# Patient Record
Sex: MALE | Race: WHITE | Employment: FULL TIME | URBAN - METROPOLITAN AREA
[De-identification: names, ages, dates, MRNs, and addresses within clinical notes are randomized per-mention and may not be internally consistent; named-entity substitution may affect disease eponyms.]

---

## 2017-01-09 ENCOUNTER — LAB CONVERSION - ENCOUNTER (OUTPATIENT)
Dept: OTHER | Facility: OTHER | Age: 37
End: 2017-01-09

## 2017-01-09 LAB
CHOLEST SERPL-MCNC: 161 MG/DL (ref 125–200)
CHOLEST/HDLC SERPL: 3.4 (CALC)
CREATININE, RANDOM URINE (HISTORICAL): 173 MG/DL (ref 20–370)
HBA1C MFR BLD HPLC: 6.9 % OF TOTAL HGB
HDLC SERPL-MCNC: 47 MG/DL
LDL CHOLESTEROL (HISTORICAL): 95 MG/DL (CALC)
MAGNESIUM, UR (HISTORICAL): 0.4 MG/DL
MICROALBUMIN/CREATININE RATIO (HISTORICAL): 2 MCG/MG CREAT
NON-HDL-CHOL (CHOL-HDL) (HISTORICAL): 114 MG/DL (CALC)
TRIGL SERPL-MCNC: 97 MG/DL

## 2017-01-10 ENCOUNTER — ALLSCRIPTS OFFICE VISIT (OUTPATIENT)
Dept: OTHER | Facility: OTHER | Age: 37
End: 2017-01-10

## 2017-07-10 DIAGNOSIS — E66.9 OBESITY: ICD-10-CM

## 2017-07-10 DIAGNOSIS — E78.5 HYPERLIPIDEMIA: ICD-10-CM

## 2017-07-10 DIAGNOSIS — E11.9 TYPE 2 DIABETES MELLITUS WITHOUT COMPLICATIONS (HCC): ICD-10-CM

## 2017-07-10 DIAGNOSIS — J45.909 UNCOMPLICATED ASTHMA: ICD-10-CM

## 2017-11-21 ENCOUNTER — LAB CONVERSION - ENCOUNTER (OUTPATIENT)
Dept: OTHER | Facility: OTHER | Age: 37
End: 2017-11-21

## 2017-11-21 LAB
A/G RATIO (HISTORICAL): 1.6 (CALC) (ref 1–2.5)
ALBUMIN SERPL BCP-MCNC: 4.1 G/DL (ref 3.6–5.1)
ALP SERPL-CCNC: 40 U/L (ref 40–115)
ALT SERPL W P-5'-P-CCNC: 37 U/L (ref 9–46)
AST SERPL W P-5'-P-CCNC: 24 U/L (ref 10–40)
BASOPHILS # BLD AUTO: 0.8 %
BASOPHILS # BLD AUTO: 59 CELLS/UL (ref 0–200)
BILIRUB SERPL-MCNC: 0.6 MG/DL (ref 0.2–1.2)
BUN SERPL-MCNC: 12 MG/DL (ref 7–25)
BUN/CREA RATIO (HISTORICAL): ABNORMAL (CALC) (ref 6–22)
CALCIUM SERPL-MCNC: 9.1 MG/DL (ref 8.6–10.3)
CHLORIDE SERPL-SCNC: 106 MMOL/L (ref 98–110)
CHOLEST SERPL-MCNC: 155 MG/DL
CHOLEST/HDLC SERPL: 3.5 (CALC)
CO2 SERPL-SCNC: 26 MMOL/L (ref 20–31)
CREAT SERPL-MCNC: 0.8 MG/DL (ref 0.6–1.35)
CREATININE, RANDOM URINE (HISTORICAL): 120 MG/DL (ref 20–370)
DEPRECATED RDW RBC AUTO: 13 % (ref 11–15)
EGFR AFRICAN AMERICAN (HISTORICAL): 134 ML/MIN/1.73M2
EGFR-AMERICAN CALC (HISTORICAL): 116 ML/MIN/1.73M2
EOSINOPHIL # BLD AUTO: 0.8 %
EOSINOPHIL # BLD AUTO: 59 CELLS/UL (ref 15–500)
GAMMA GLOBULIN (HISTORICAL): 2.5 G/DL (CALC) (ref 1.9–3.7)
GLUCOSE (HISTORICAL): 147 MG/DL (ref 65–99)
HBA1C MFR BLD HPLC: 7.2 % OF TOTAL HGB
HCT VFR BLD AUTO: 43.2 % (ref 38.5–50)
HDLC SERPL-MCNC: 44 MG/DL
HGB BLD-MCNC: 14.5 G/DL (ref 13.2–17.1)
LDL CHOLESTEROL (HISTORICAL): 91 MG/DL (CALC)
LYMPHOCYTES # BLD AUTO: 2568 CELLS/UL (ref 850–3900)
LYMPHOCYTES # BLD AUTO: 34.7 %
MAGNESIUM, UR (HISTORICAL): 0.3 MG/DL
MCH RBC QN AUTO: 28.5 PG (ref 27–33)
MCHC RBC AUTO-ENTMCNC: 33.6 G/DL (ref 32–36)
MCV RBC AUTO: 85 FL (ref 80–100)
MICROALBUMIN/CREATININE RATIO (HISTORICAL): 3 MCG/MG CREAT
MONOCYTES # BLD AUTO: 407 CELLS/UL (ref 200–950)
MONOCYTES (HISTORICAL): 5.5 %
NEUTROPHILS # BLD AUTO: 4307 CELLS/UL (ref 1500–7800)
NEUTROPHILS # BLD AUTO: 58.2 %
NON-HDL-CHOL (CHOL-HDL) (HISTORICAL): 111 MG/DL (CALC)
PLATELET # BLD AUTO: 288 THOUSAND/UL (ref 140–400)
PMV BLD AUTO: 10 FL (ref 7.5–12.5)
POTASSIUM SERPL-SCNC: 4.1 MMOL/L (ref 3.5–5.3)
RBC # BLD AUTO: 5.08 MILLION/UL (ref 4.2–5.8)
SODIUM SERPL-SCNC: 141 MMOL/L (ref 135–146)
TOTAL PROTEIN (HISTORICAL): 6.6 G/DL (ref 6.1–8.1)
TRIGL SERPL-MCNC: 105 MG/DL
TSH SERPL DL<=0.05 MIU/L-ACNC: 1.28 MIU/L (ref 0.4–4.5)
WBC # BLD AUTO: 7.4 THOUSAND/UL (ref 3.8–10.8)

## 2017-11-22 ENCOUNTER — ALLSCRIPTS OFFICE VISIT (OUTPATIENT)
Dept: OTHER | Facility: OTHER | Age: 37
End: 2017-11-22

## 2017-11-23 NOTE — PROGRESS NOTES
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 6) (G57 60)    Plan  Asthma    · Ventolin  (90 Base) MCG/ACT Inhalation Aerosol Solution; INHALE 1 TO2 PUFFS EVERY 4 TO 6 HOURS AS NEEDED  Controlled type 2 diabetes mellitus without complication    · Janumet -1000 MG Oral Tablet Extended Release 24 Hour; take 1 tabletby mouth daily   · *VB - Foot Exam; Status:Resulted - Requires Verification,Retrospective By ProtocolAuthorization;   Done: [id number] 02:29PM   · Lisbet Fay MD, Ally Horn  (Ophthalmology) Co-Management  *  Status: Hold For -Scheduling,Retrospective By Protocol Authorization  Requested for: [id number]  Care Summary provided  : Yes  Controlled type 2 diabetes mellitus without complication, Hyperlipidemia, Obesity    · (1) CBC/PLT/DIFF; Status:Active; Requested for:77Lku2647;    · (1) COMPREHENSIVE METABOLIC PANEL; Status:Active; Requested for:73Sui7872;    · (1) HEMOGLOBIN A1C; Status:Active; Requested for:62Drz2132;    · (1) LIPID PANEL FASTING W DIRECT LDL REFLEX; Status:Active; Requestedfor:04Bcg9665;    · (1) MICROALBUMIN CREATININE RATIO, RANDOM URINE; Status:Active; Requestedfor:46Zpb5265;    · (1) TSH WITH FT4 REFLEX; Status:Active; Requested for:59Ciw9722; Tinea cruris    · Clotrimazole-Betamethasone 1-0 05 % External Cream; APPLY  AND RUB  IN ATHIN FILM TO AFFECTED AREAS TWICE DAILY  (AM AND PM)    Discussion/Summary    1  Type 2 diabetes-uncontrolled-without complication-hemoglobin Z3F has gone up from 6 9 to 7 2  He will continue the same therapy but changed his diet over the next 3-4 months and reassess  Foot exam was within normal limits in the office  Hyperlipidemia-stable  Cholesterol numbers were reviewed with the patient  Continue dietary changes  Obesity-continue weight loss efforts  Tinea carlos-recommend trial Lotrisone cream applied twice daily to the affected area  Ponce's neuroma-symptoms are rather mild and intermittent  If they worsen or persist he will be referred to Podiatry for further treatment  in 3-4 months with labs recommended  maintenance-flu vaccine was declined by patient  Chief Complaint  Follow up to chronic conditions and review bw  History of Present Illness  This is a 26-year-old gentleman that presents to the office for follow-up of chronic health conditions including type 2 diabetes, hyperlipidemia, and obesity  He does mention that recently he has been eating some junk food with the holidays  He expects that this may continue through December but then he is usually back on his diet and watching his consumption  He has had recent blood test which she is here to discuss  He does continue to take Janumet tablet daily  He has not had any side effects or problems with the medication  He also mentions a thickening on his scrotum which has been itchy and irritated at times  He thinks that it may be sweater moisture related as he sits most of the day  He has tried gold Bond powder and changing underwear frequently with little benefit  It does seem to come and go  He does also mention some pain on his foot that seems to come and go  This is worse when he is on his feet for a period of time  It is located between the ball of the feet at the 2nd and 3rd interspace  It is often tender to touch  Review of Systems   Constitutional: no fever,-- not feeling poorly,-- no chills-- and-- not feeling tired  Eyes: no eyesight problems-- and-- no purulent discharge from the eyes  ENT: no nosebleeds-- and-- no nasal discharge  Cardiovascular: no chest pain-- and-- no palpitations  Respiratory: no shortness of breath,-- no cough,-- no wheezing-- and-- no shortness of breath during exertion  Gastrointestinal: no abdominal pain,-- no nausea-- and-- no diarrhea    Musculoskeletal: no arthralgias-- and-- no myalgias  Neurological: no headache,-- no numbness-- and-- no dizziness  Hematologic/Lymphatic: no swollen glands-- and-- no swollen glands in the neck  Active Problems  1  Asthma (493 90) (J45 909)   2  Controlled type 2 diabetes mellitus without complication (968 32) (D67 5)   3  Elevated alkaline phosphatase level (790 5) (R74 8)   4  Denied: History of mental disorder   5  Hyperlipidemia (272 4) (E78 5)   6  Obesity (278 00) (E66 9)   7  Sinusitis, acute (461 9) (J01 90)    Past Medical History  1  Denied: History of mental disorder    The active problems and past medical history were reviewed and updated today  Family History  Family History    1  No family history of mental disorder    Social History     · Does not use illicit drugs (F91 40) (Z78 9)   · Employed   ·    · Never a smoker   · No alcohol use   · No secondhand smoke exposure (V49 89) (Z78 9)    Current Meds   1  Janumet -1000 MG Oral Tablet Extended Release 24 Hour; take 1 tablet by mouth daily; Therapy: 34BHF0197 to (Evaluate:01Jan2018)  Requested for: 61CUQ3719; Last Rx:70Ryr6664 Ordered   2  OneTouch Verio In [employer]; TEST 3 TIMES DAILY; Therapy: 29Apr2015 to (Evaluate:02Jun2016)  Requested for: 22VXG4255; Last Rx:08Jun2015 Ordered   3  Ventolin  (90 Base) MCG/ACT Inhalation Aerosol Solution; INHALE 1 TO 2 PUFFS EVERY 4 TO 6 HOURS AS NEEDED; Therapy: 67JBL2490 to (Last Rx:25Jan2016)  Requested for: 25Jan2016 Ordered    The medication list was reviewed and updated today  Allergies  1  No Known Drug Allergies    Vitals  Vital Signs    Recorded: 22Nov2017 02:26PM   Heart Rate 88   Systolic 475   Diastolic 78   Height 6 ft 5 in   Weight 376 lb 6 oz   BMI Calculated 44 63   BSA Calculated 2 93       Physical Exam   Constitutional  General appearance: No acute distress, well appearing and well nourished  Eyes  Conjunctiva and lids: No swelling, erythema, or discharge     Pupils and irises: Equal, round and reactive to light  Ears, Nose, Mouth, and Throat  External inspection of ears and nose: Normal    Otoscopic examination: Tympanic membrance translucent with normal light reflex  Canals patent without erythema  Nasal mucosa, septum, and turbinates: Normal without edema or erythema  Oropharynx: Normal with no erythema, edema, exudate or lesions  Pulmonary  Respiratory effort: No increased work of breathing or signs of respiratory distress  Auscultation of lungs: Clear to auscultation, equal breath sounds bilaterally, no wheezes, no rales, no rhonci  Cardiovascular  Palpation of heart: Normal PMI, no thrills  Auscultation of heart: Normal rate and rhythm, normal S1 and S2, without murmurs  Examination of extremities for edema and/or varicosities: Normal    Abdomen  Abdomen: Non-tender, no masses  Liver and spleen: No hepatomegaly or splenomegaly  Lymphatic  Palpation of lymph nodes in neck: No lymphadenopathy  Musculoskeletal  Gait and station: Normal    Digits and nails: Normal without clubbing or cyanosis  Skin  Skin and subcutaneous tissue: Normal without rashes or lesions  Neurologic  Cranial nerves: Cranial nerves 2-12 intact  Reflexes: 2+ and symmetric  Psychiatric  Orientation to person, place and time: Normal    Mood and affect: Normal    Additional Exam:  There is some scrotal thickening and erythema at the anterior left side  Socks and shoes removed, Right Foot Findings: normal foot, no swelling, no erythema  The right toes were normal   The sensory exam showed normal vibratory sensation at the level of the toes on the right  Normal tactile sensation with monofilament testing throughout the right foot  Socks and shoes removed, Left Foot Findings: normal foot, no swelling, no erythema  The left toes were normal   The sensory exam showed normal vibratory sensation at the level of the toes on the left   Normal tactile sensation with monofilament testing throughout the left foot  Capillary refills findings on the right were normal in the toes  Pulses:  2+ in the posterior tibialis on the right  2+ in the dorsalis pedis on the right  Capillary refills findings on the left were normal in the toes  Pulses:  2+ in the posterior tibialis on the left  2+ in the dorsalis pedis on the left  Assign Risk Category: 0: No loss of protective sensation, no deformity  No present risk  Results/Data  PHQ-2 Adult Depression Screening 22Nov2017 02:35PM User, [facility]     Test Name Result Flag Reference   PHQ-2 Adult Depression Score 0       Over the last two weeks, how often have you been bothered by any of the following problems?  Little interest or pleasure in doing things: Not at all - 0 Feeling down, depressed, or hopeless: Not at all - 0   PHQ-2 Adult Depression Screening Negative       *VB - Foot Exam 66VKQ8880 02:29PM Ovidio Brown     Test Name Result Flag Reference   FOOT EXAM [id number]                       Signatures   Electronically signed by : Christopher Bhatti [facility]; Nov 22 2017  2:59PM EST                       (Author)    Electronically signed by : NBA Pelaez ; Nov 22 2017  6:27PM EST

## 2018-01-12 VITALS
HEART RATE: 80 BPM | HEIGHT: 77 IN | WEIGHT: 315 LBS | DIASTOLIC BLOOD PRESSURE: 78 MMHG | SYSTOLIC BLOOD PRESSURE: 118 MMHG | BODY MASS INDEX: 37.19 KG/M2

## 2018-01-13 VITALS
WEIGHT: 315 LBS | BODY MASS INDEX: 37.19 KG/M2 | DIASTOLIC BLOOD PRESSURE: 78 MMHG | HEIGHT: 77 IN | SYSTOLIC BLOOD PRESSURE: 114 MMHG | HEART RATE: 88 BPM

## 2018-02-22 DIAGNOSIS — E11.9 TYPE 2 DIABETES MELLITUS WITHOUT COMPLICATIONS (HCC): ICD-10-CM

## 2018-02-22 DIAGNOSIS — E66.9 OBESITY: ICD-10-CM

## 2018-02-22 DIAGNOSIS — E78.5 HYPERLIPIDEMIA: ICD-10-CM

## 2018-03-05 ENCOUNTER — OFFICE VISIT (OUTPATIENT)
Dept: FAMILY MEDICINE CLINIC | Facility: CLINIC | Age: 38
End: 2018-03-05
Payer: COMMERCIAL

## 2018-03-05 VITALS
WEIGHT: 315 LBS | SYSTOLIC BLOOD PRESSURE: 130 MMHG | DIASTOLIC BLOOD PRESSURE: 80 MMHG | BODY MASS INDEX: 37.19 KG/M2 | HEART RATE: 72 BPM | HEIGHT: 77 IN

## 2018-03-05 DIAGNOSIS — E11.9 CONTROLLED TYPE 2 DIABETES MELLITUS WITHOUT COMPLICATION, WITHOUT LONG-TERM CURRENT USE OF INSULIN (HCC): Primary | ICD-10-CM

## 2018-03-05 DIAGNOSIS — B35.6 TINEA CRURIS: ICD-10-CM

## 2018-03-05 DIAGNOSIS — IMO0001 CLASS 3 OBESITY WITHOUT SERIOUS COMORBIDITY IN ADULT, UNSPECIFIED BMI, UNSPECIFIED OBESITY TYPE: ICD-10-CM

## 2018-03-05 DIAGNOSIS — E78.5 HYPERLIPIDEMIA, UNSPECIFIED HYPERLIPIDEMIA TYPE: ICD-10-CM

## 2018-03-05 PROBLEM — G57.60 MORTON'S NEUROMA: Status: ACTIVE | Noted: 2017-11-22

## 2018-03-05 LAB
ALBUMIN SERPL-MCNC: 4.1 G/DL (ref 3.6–5.1)
ALBUMIN/CREAT UR: 3 MCG/MG CREAT
ALBUMIN/GLOB SERPL: 1.5 (CALC) (ref 1–2.5)
ALP SERPL-CCNC: 46 U/L (ref 40–115)
ALT SERPL-CCNC: 32 U/L (ref 9–46)
AST SERPL-CCNC: 23 U/L (ref 10–40)
BASOPHILS # BLD AUTO: 58 CELLS/UL (ref 0–200)
BASOPHILS NFR BLD AUTO: 0.7 %
BILIRUB SERPL-MCNC: 0.6 MG/DL (ref 0.2–1.2)
BUN SERPL-MCNC: 13 MG/DL (ref 7–25)
BUN/CREAT SERPL: ABNORMAL (CALC) (ref 6–22)
CALCIUM SERPL-MCNC: 9.4 MG/DL (ref 8.6–10.3)
CHLORIDE SERPL-SCNC: 106 MMOL/L (ref 98–110)
CHOLEST SERPL-MCNC: 154 MG/DL
CHOLEST/HDLC SERPL: 3.6 (CALC)
CO2 SERPL-SCNC: 24 MMOL/L (ref 20–31)
CREAT SERPL-MCNC: 0.76 MG/DL (ref 0.6–1.35)
CREAT UR-MCNC: 243 MG/DL (ref 20–370)
EOSINOPHIL # BLD AUTO: 50 CELLS/UL (ref 15–500)
EOSINOPHIL NFR BLD AUTO: 0.6 %
ERYTHROCYTE [DISTWIDTH] IN BLOOD BY AUTOMATED COUNT: 12.7 % (ref 11–15)
GLOBULIN SER CALC-MCNC: 2.7 G/DL (CALC) (ref 1.9–3.7)
GLUCOSE SERPL-MCNC: 160 MG/DL (ref 65–99)
HBA1C MFR BLD: 7.8 % OF TOTAL HGB
HCT VFR BLD AUTO: 44.4 % (ref 38.5–50)
HDLC SERPL-MCNC: 43 MG/DL
HGB BLD-MCNC: 15 G/DL (ref 13.2–17.1)
LDLC SERPL CALC-MCNC: 93 MG/DL (CALC)
LYMPHOCYTES # BLD AUTO: 2847 CELLS/UL (ref 850–3900)
LYMPHOCYTES NFR BLD AUTO: 34.3 %
MCH RBC QN AUTO: 29 PG (ref 27–33)
MCHC RBC AUTO-ENTMCNC: 33.8 G/DL (ref 32–36)
MCV RBC AUTO: 85.9 FL (ref 80–100)
MICROALBUMIN UR-MCNC: 0.8 MG/DL
MONOCYTES # BLD AUTO: 473 CELLS/UL (ref 200–950)
MONOCYTES NFR BLD AUTO: 5.7 %
NEUTROPHILS # BLD AUTO: 4872 CELLS/UL (ref 1500–7800)
NEUTROPHILS NFR BLD AUTO: 58.7 %
NONHDLC SERPL-MCNC: 111 MG/DL (CALC)
PLATELET # BLD AUTO: 271 THOUSAND/UL (ref 140–400)
PMV BLD REES-ECKER: 10.1 FL (ref 7.5–12.5)
POTASSIUM SERPL-SCNC: 4 MMOL/L (ref 3.5–5.3)
PROT SERPL-MCNC: 6.8 G/DL (ref 6.1–8.1)
RBC # BLD AUTO: 5.17 MILLION/UL (ref 4.2–5.8)
SL AMB EGFR AFRICAN AMERICAN: 136 ML/MIN/1.73M2
SL AMB EGFR NON AFRICAN AMERICAN: 117 ML/MIN/1.73M2
SODIUM SERPL-SCNC: 141 MMOL/L (ref 135–146)
TRIGL SERPL-MCNC: 90 MG/DL
TSH SERPL-ACNC: 1.51 MIU/L (ref 0.4–4.5)
WBC # BLD AUTO: 8.3 THOUSAND/UL (ref 3.8–10.8)

## 2018-03-05 PROCEDURE — 99214 OFFICE O/P EST MOD 30 MIN: CPT | Performed by: PHYSICIAN ASSISTANT

## 2018-03-05 RX ORDER — ALBUTEROL SULFATE 90 UG/1
1-2 AEROSOL, METERED RESPIRATORY (INHALATION)
COMMUNITY
Start: 2016-01-25

## 2018-03-05 RX ORDER — GLIMEPIRIDE 2 MG/1
2 TABLET ORAL
Qty: 30 TABLET | Refills: 5 | Status: SHIPPED | OUTPATIENT
Start: 2018-03-05 | End: 2018-07-05 | Stop reason: SDUPTHER

## 2018-03-05 RX ORDER — NYSTATIN 100000 [USP'U]/G
POWDER TOPICAL 3 TIMES DAILY
Qty: 45 G | Refills: 0 | Status: SHIPPED | OUTPATIENT
Start: 2018-03-05

## 2018-03-05 NOTE — PROGRESS NOTES
Assessment/Plan:  Patient Instructions   1  Type 2 diabetes/uncontrolled with hemoglobin A1c going up from 7 2 to 7 8  We discussed treatment options and at this point he was agreeable to adding glimepiride 2 mg daily  He will continue on Janumet /1000 mg daily  We will reassess his hemoglobin A1c in 3 months time  He is to continue regular assessment with Ophthalmology yearly  2   Hyperlipidemia-cholesterol numbers were reviewed with patient and currently stable with diet control  3   Tinea cruris-patient failed Lotrisone cream and thus we will try nice stop powder  If symptoms are still not better in the next 2 weeks he will be referred to Dermatology  Patient verbalized understanding and agreement with plan  No problem-specific Assessment & Plan notes found for this encounter  Diagnoses and all orders for this visit:    Controlled type 2 diabetes mellitus without complication, without long-term current use of insulin (HCC)  -     HEMOGLOBIN A1C W/ EAG ESTIMATION; Future  -     Comprehensive metabolic panel; Future  -     glimepiride (AMARYL) 2 mg tablet; Take 1 tablet (2 mg total) by mouth daily with breakfast    Hyperlipidemia, unspecified hyperlipidemia type  -     Comprehensive metabolic panel; Future  -     Lipid Panel with Direct LDL reflex; Future    Class 3 obesity without serious comorbidity in adult, unspecified BMI, unspecified obesity type  -     Comprehensive metabolic panel; Future    Tinea cruris  -     nystatin (MYCOSTATIN) powder; Apply topically 3 (three) times a day          Subjective:      Patient ID: Alie Cancer is a 41 y o  male  Chief complaint:  Pt is here for follow up of diabetes and to review blood work   ~cd    HPI:  This is a 16-year-old gentleman that presents to the office for follow-up of diabetes and recent blood test which she had completed  He is currently taking Janumet XR daily for his diabetes management    Despite this he notices that his sugars have been elevated recently with many readings between 160 and 200 in the morning fasting  He states that he has been feeling worse than usual as well  He has not been as physically active  The following portions of the patient's history were reviewed and updated as appropriate: allergies, current medications, past family history, past medical history, past social history, past surgical history and problem list     Review of Systems   Constitutional: Negative for chills, fatigue and fever  HENT: Negative for congestion, ear pain and sinus pressure  Eyes: Negative for visual disturbance  Respiratory: Negative for cough, chest tightness and shortness of breath  Cardiovascular: Negative for chest pain and palpitations  Gastrointestinal: Negative for diarrhea, nausea and vomiting  Endocrine: Negative for polyuria  Genitourinary: Negative for dysuria and frequency  Musculoskeletal: Negative for arthralgias and myalgias  Skin: Negative for pallor and rash  Neurological: Negative for dizziness, weakness, light-headedness, numbness and headaches  Psychiatric/Behavioral: Negative for agitation, behavioral problems and sleep disturbance  All other systems reviewed and are negative  Objective  Vitals:    03/05/18 1806   BP: 130/80   BP Location: Left arm   Patient Position: Sitting   Cuff Size: Large   Pulse: 72   Weight: (!) 171 kg (377 lb 12 8 oz)   Height: 6' 5" (1 956 m)              Physical Exam   Constitutional: He is oriented to person, place, and time  He appears well-developed and well-nourished  No distress  HENT:   Head: Normocephalic and atraumatic  Right Ear: External ear normal    Left Ear: External ear normal    Nose: Nose normal    Mouth/Throat: Oropharynx is clear and moist  No oropharyngeal exudate  Eyes: Conjunctivae and EOM are normal  Pupils are equal, round, and reactive to light  Neck: Normal range of motion  Neck supple   No tracheal deviation present  No thyromegaly present  Cardiovascular: Normal rate, regular rhythm and normal heart sounds  Exam reveals no friction rub  No murmur heard  Pulmonary/Chest: Effort normal and breath sounds normal  No respiratory distress  He has no wheezes  He has no rales  Abdominal: Soft  Bowel sounds are normal  He exhibits no distension  There is no tenderness  There is no rebound and no guarding  Musculoskeletal: Normal range of motion  He exhibits no edema or tenderness  Lymphadenopathy:     He has no cervical adenopathy  Neurological: He is alert and oriented to person, place, and time  No cranial nerve deficit  Coordination normal    Skin: Skin is warm and dry  Rash noted  No erythema  There is some erythema and generalized thickening of the scrotal tissue  Psychiatric: He has a normal mood and affect  His behavior is normal  Thought content normal    Nursing note and vitals reviewed

## 2018-05-07 ENCOUNTER — OFFICE VISIT (OUTPATIENT)
Dept: FAMILY MEDICINE CLINIC | Facility: CLINIC | Age: 38
End: 2018-05-07
Payer: COMMERCIAL

## 2018-05-07 VITALS
SYSTOLIC BLOOD PRESSURE: 120 MMHG | BODY MASS INDEX: 37.19 KG/M2 | DIASTOLIC BLOOD PRESSURE: 60 MMHG | WEIGHT: 315 LBS | HEIGHT: 77 IN | HEART RATE: 76 BPM

## 2018-05-07 DIAGNOSIS — F41.9 ANXIETY: ICD-10-CM

## 2018-05-07 DIAGNOSIS — N50.89 MASS OF LEFT TESTICLE: Primary | ICD-10-CM

## 2018-05-07 PROCEDURE — 99214 OFFICE O/P EST MOD 30 MIN: CPT | Performed by: PHYSICIAN ASSISTANT

## 2018-05-07 RX ORDER — ESCITALOPRAM OXALATE 10 MG/1
10 TABLET ORAL DAILY
Qty: 30 TABLET | Refills: 3 | Status: SHIPPED | OUTPATIENT
Start: 2018-05-07 | End: 2018-06-11 | Stop reason: SDUPTHER

## 2018-05-07 NOTE — PROGRESS NOTES
Assessment/Plan:  Patient Instructions   1  Left testicular mass-patient has a small ridge like area that he can feel on the left lateral testicle  Ultrasound is recommended for further characterization  2   Anxiety-patient is having stress over his wife relieving him recently  He is having a lot of emotional lability  He is worried about his job in looking at other jobs  He is considering moving back to the [place] area where he has more friends and support network for himself  We discussed treatment options at this point and I feel Lexapro 10 mg daily would be a good option  We discussed possible side effects of the medication and appropriate treatment  Patient verbalized understanding and agreement  We do have follow-up in the next month to reassess therapy  3   Type 2 diabetes-last hemoglobin A1c was 7 8  Patient has been exercising and watching his diet closely and has lost 20 lb in the past 2 months however  He has also had glimepiride at our last visit and we will reassess therapy in June as scheduled  No problem-specific Assessment & Plan notes found for this encounter  Diagnoses and all orders for this visit:    Mass of left testicle  -     US scrotum and testicles; Future    Anxiety  -     escitalopram (LEXAPRO) 10 mg tablet; Take 1 tablet (10 mg total) by mouth daily          Subjective: pt is here for a dry skin patch on his testicle~cd     Patient ID: Jeniffer Randall is a 41 y o  male  HPI:  This is a 45-year-old gentleman that presents to the office with concerns over left testicular abnormality  He feels that there is a lump for [facility] within the testicle  He has also had some dry skin patch which has been treated with Mycolog powder  This seems to help it get better but never completely resolves  He occasionally has a pain in the testicle but it does not seem to last long  He also states that 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    He is wondering if there may be something going on within the testicle read the tubing that is affecting him  Furthermore he is having a lot of anxiety as his wife is leaving him and has moved out already  He is having a lot of emotional ups and Downs  He does not have a lot of family or support in the area and is looking to possibly move to [place] or [place] from where he is originally from  The following portions of the patient's history were reviewed and updated as appropriate: allergies, current medications, past family history, past medical history, past social history, past surgical history and problem list     Review of Systems   Constitutional: Negative for chills, fatigue and fever  HENT: Negative for congestion, ear pain and sinus pressure  Eyes: Negative for visual disturbance  Respiratory: Negative for cough, chest tightness and shortness of breath  Cardiovascular: Negative for chest pain and palpitations  Gastrointestinal: Negative for diarrhea, nausea and vomiting  Endocrine: Negative for polyuria  Genitourinary: Negative for dysuria and frequency  Musculoskeletal: Negative for arthralgias and myalgias  Skin: Negative for pallor and rash  Neurological: Negative for dizziness, weakness, light-headedness, numbness and headaches  Psychiatric/Behavioral: Negative for agitation, behavioral problems and sleep disturbance  All other systems reviewed and are negative  Objective:  Vitals:    05/07/18 1913   BP: 120/60   BP Location: Left arm   Patient Position: Sitting   Cuff Size: Large   Pulse: 76   Weight: (!) 162 kg (357 lb)   Height: 6' 5" (1 956 m)                Physical Exam   Constitutional: He is oriented to person, place, and time  He appears well-developed and well-nourished  No distress  HENT:   Head: Normocephalic and atraumatic     Right Ear: External ear normal    Left Ear: External ear normal    Nose: Nose normal    Mouth/Throat: Oropharynx is clear and moist  No oropharyngeal exudate  Eyes: Conjunctivae and EOM are normal  Pupils are equal, round, and reactive to light  Neck: Normal range of motion  Neck supple  No tracheal deviation present  No thyromegaly present  Cardiovascular: Normal rate, regular rhythm and normal heart sounds  Exam reveals no friction rub  No murmur heard  Pulmonary/Chest: Effort normal and breath sounds normal  No respiratory distress  He has no wheezes  He has no rales  Abdominal: Soft  Bowel sounds are normal  He exhibits no distension  There is no tenderness  There is no rebound and no guarding  Genitourinary:   Genitourinary Comments: Left testicle with a small ridge like area which is palpable on the lateral testicle  Musculoskeletal: Normal range of motion  He exhibits no edema or tenderness  Lymphadenopathy:     He has no cervical adenopathy  Neurological: He is alert and oriented to person, place, and time  No cranial nerve deficit  Coordination normal    Skin: Skin is warm and dry  No rash noted  No erythema  Psychiatric: His behavior is normal  Thought content normal    Depressed  Nursing note and vitals reviewed

## 2018-05-08 NOTE — PATIENT INSTRUCTIONS
1   Left testicular mass-patient has a small ridge like area that he can feel on the left lateral testicle  Ultrasound is recommended for further characterization  2   Anxiety-patient is having stress over his wife relieving him recently  He is having a lot of emotional lability  He is worried about his job in looking at other jobs  He is considering moving back to the [place] area where he has more friends and support network for himself  We discussed treatment options at this point and I feel Lexapro 10 mg daily would be a good option  We discussed possible side effects of the medication and appropriate treatment  Patient verbalized understanding and agreement  We do have follow-up in the next month to reassess therapy  3   Type 2 diabetes-last hemoglobin A1c was 7 8  Patient has been exercising and watching his diet closely and has lost 20 lb in the past 2 months however  He has also had glimepiride at our last visit and we will reassess therapy in June as scheduled

## 2018-06-10 LAB
ALBUMIN SERPL-MCNC: 4.1 G/DL (ref 3.6–5.1)
ALBUMIN/GLOB SERPL: 1.6 (CALC) (ref 1–2.5)
ALP SERPL-CCNC: 42 U/L (ref 40–115)
ALT SERPL-CCNC: 22 U/L (ref 9–46)
AST SERPL-CCNC: 17 U/L (ref 10–40)
BILIRUB SERPL-MCNC: 0.6 MG/DL (ref 0.2–1.2)
BUN SERPL-MCNC: 13 MG/DL (ref 7–25)
BUN/CREAT SERPL: ABNORMAL (CALC) (ref 6–22)
CALCIUM SERPL-MCNC: 9.6 MG/DL (ref 8.6–10.3)
CHLORIDE SERPL-SCNC: 108 MMOL/L (ref 98–110)
CHOLEST SERPL-MCNC: 157 MG/DL
CHOLEST/HDLC SERPL: 3.4 (CALC)
CO2 SERPL-SCNC: 25 MMOL/L (ref 20–31)
CREAT SERPL-MCNC: 0.78 MG/DL (ref 0.6–1.35)
EST. AVERAGE GLUCOSE BLD GHB EST-MCNC: 117 (CALC)
EST. AVERAGE GLUCOSE BLD GHB EST-SCNC: 6.5 (CALC)
GLOBULIN SER CALC-MCNC: 2.5 G/DL (CALC) (ref 1.9–3.7)
GLUCOSE SERPL-MCNC: 104 MG/DL (ref 65–99)
HBA1C MFR BLD: 5.7 % OF TOTAL HGB
HDLC SERPL-MCNC: 46 MG/DL
LDLC SERPL CALC-MCNC: 95 MG/DL (CALC)
NONHDLC SERPL-MCNC: 111 MG/DL (CALC)
POTASSIUM SERPL-SCNC: 4.3 MMOL/L (ref 3.5–5.3)
PROT SERPL-MCNC: 6.6 G/DL (ref 6.1–8.1)
SL AMB EGFR AFRICAN AMERICAN: 135 ML/MIN/1.73M2
SL AMB EGFR NON AFRICAN AMERICAN: 116 ML/MIN/1.73M2
SODIUM SERPL-SCNC: 142 MMOL/L (ref 135–146)
TRIGL SERPL-MCNC: 73 MG/DL

## 2018-06-11 ENCOUNTER — OFFICE VISIT (OUTPATIENT)
Dept: FAMILY MEDICINE CLINIC | Facility: CLINIC | Age: 38
End: 2018-06-11
Payer: COMMERCIAL

## 2018-06-11 VITALS
DIASTOLIC BLOOD PRESSURE: 60 MMHG | WEIGHT: 315 LBS | BODY MASS INDEX: 37.19 KG/M2 | HEIGHT: 77 IN | SYSTOLIC BLOOD PRESSURE: 138 MMHG | HEART RATE: 72 BPM

## 2018-06-11 DIAGNOSIS — E78.5 HYPERLIPIDEMIA, UNSPECIFIED HYPERLIPIDEMIA TYPE: ICD-10-CM

## 2018-06-11 DIAGNOSIS — F41.9 ANXIETY: ICD-10-CM

## 2018-06-11 DIAGNOSIS — E11.9 CONTROLLED TYPE 2 DIABETES MELLITUS WITHOUT COMPLICATION, WITHOUT LONG-TERM CURRENT USE OF INSULIN (HCC): Primary | ICD-10-CM

## 2018-06-11 PROCEDURE — 1036F TOBACCO NON-USER: CPT | Performed by: PHYSICIAN ASSISTANT

## 2018-06-11 PROCEDURE — 99214 OFFICE O/P EST MOD 30 MIN: CPT | Performed by: PHYSICIAN ASSISTANT

## 2018-06-11 PROCEDURE — 3008F BODY MASS INDEX DOCD: CPT | Performed by: PHYSICIAN ASSISTANT

## 2018-06-11 RX ORDER — ESCITALOPRAM OXALATE 10 MG/1
10 TABLET ORAL DAILY
Qty: 90 TABLET | Refills: 3 | Status: SHIPPED | OUTPATIENT
Start: 2018-06-11 | End: 2018-07-05 | Stop reason: SDUPTHER

## 2018-06-11 NOTE — PATIENT INSTRUCTIONS
1   Type 2 diabetes-presently stable with hemoglobin A1c coming down from 7 8 to 5 7  Continue healthy diet and exercise as well as glimepiride in the morning and Janumet twice daily  2   Hyperlipidemia-cholesterol was reviewed and presently stable with diet efforts  3   Anxiety and depression-presently stable on Lexapro 10 mg daily  Recommend continue treatment for minimum of 6-12 months  Patient will be moving to [place] and medications will be continued until he can find a family doctor locally

## 2018-06-11 NOTE — PROGRESS NOTES
Assessment/Plan:  Patient Instructions   1  Type 2 diabetes-presently stable with hemoglobin A1c coming down from 7 8 to 5 7  Continue healthy diet and exercise as well as glimepiride in the morning and Janumet twice daily  2   Hyperlipidemia-cholesterol was reviewed and presently stable with diet efforts  3   Anxiety and depression-presently stable on Lexapro 10 mg daily  Recommend continue treatment for minimum of 6-12 months  Patient will be moving to [place] and medications will be continued until he can find a family doctor locally  Diagnoses and all orders for this visit:    Controlled type 2 diabetes mellitus without complication, without long-term current use of insulin (HCC)    Hyperlipidemia, unspecified hyperlipidemia type    Anxiety  -     escitalopram (LEXAPRO) 10 mg tablet; Take 1 tablet (10 mg total) by mouth daily          Subjective: pt is here to review his recent blood work~cd     Patient ID: Dimitri Sanders is a 41 y o  male  HPI:  This is a 29-year-old gentleman that presents to the office for follow-up of diabetes, hyperlipidemia, and recent anxiety and depression over the divorce from his wife  He states that he has been doing much better since he has been on Lexapro 10 mg daily and is happy to continue it  He has been working on changing his situation and will be taking a new job in [place]  He will be leaving this weekend is going under some transition and would like to continue the medication  He denies any side effects of it  He has also been working hard at his diet and exercise as well as weight loss  He has had recent blood tests reach guarding his sugars and cholesterol which she would like to review  He has started taking glimepiride in the morning and continues to use Janumet          The following portions of the patient's history were reviewed and updated as appropriate: allergies, current medications, past family history, past medical history, past social history, past surgical history and problem list     Review of Systems   Constitutional: Negative for chills, fatigue and fever  HENT: Negative for congestion, ear pain and sinus pressure  Eyes: Negative for visual disturbance  Respiratory: Negative for cough, chest tightness and shortness of breath  Cardiovascular: Negative for chest pain and palpitations  Gastrointestinal: Negative for diarrhea, nausea and vomiting  Endocrine: Negative for polyuria  Genitourinary: Negative for dysuria and frequency  Musculoskeletal: Negative for arthralgias and myalgias  Skin: Negative for pallor and rash  Neurological: Negative for dizziness, weakness, light-headedness, numbness and headaches  Psychiatric/Behavioral: Negative for agitation, behavioral problems and sleep disturbance  All other systems reviewed and are negative  Objective:  Vitals:    06/11/18 1759   BP: 138/60   BP Location: Left arm   Patient Position: Sitting   Cuff Size: Large   Pulse: 72   Weight: (!) 161 kg (354 lb 9 6 oz)   Height: 6' 5" (1 956 m)       /60 (BP Location: Left arm, Patient Position: Sitting, Cuff Size: Large)   Pulse 72   Ht 6' 5" (1 956 m)   Wt (!) 161 kg (354 lb 9 6 oz)   BMI 42 05 kg/m²          Physical Exam   Constitutional: He is oriented to person, place, and time  He appears well-developed and well-nourished  No distress  HENT:   Head: Normocephalic and atraumatic  Right Ear: External ear normal    Left Ear: External ear normal    Nose: Nose normal    Mouth/Throat: Oropharynx is clear and moist  No oropharyngeal exudate  Eyes: Conjunctivae and EOM are normal  Pupils are equal, round, and reactive to light  Neck: Normal range of motion  Neck supple  No tracheal deviation present  No thyromegaly present  Cardiovascular: Normal rate, regular rhythm and normal heart sounds  Exam reveals no friction rub  No murmur heard    Pulmonary/Chest: Effort normal and breath sounds normal  No respiratory distress  He has no wheezes  He has no rales  Abdominal: Soft  Bowel sounds are normal  He exhibits no distension  There is no tenderness  There is no rebound and no guarding  Musculoskeletal: Normal range of motion  He exhibits no edema or tenderness  Lymphadenopathy:     He has no cervical adenopathy  Neurological: He is alert and oriented to person, place, and time  No cranial nerve deficit  Coordination normal    Skin: Skin is warm and dry  No rash noted  No erythema  Psychiatric: He has a normal mood and affect  His behavior is normal  Thought content normal    Nursing note and vitals reviewed

## 2018-07-02 DIAGNOSIS — E11.9 TYPE 2 DIABETES MELLITUS WITHOUT COMPLICATION, WITHOUT LONG-TERM CURRENT USE OF INSULIN (HCC): Primary | ICD-10-CM

## 2018-07-02 RX ORDER — SITAGLIPTIN AND METFORMIN HYDROCHLORIDE 100; 1000 MG/1; MG/1
1 TABLET, FILM COATED, EXTENDED RELEASE ORAL DAILY
Qty: 30 TABLET | Refills: 5 | Status: SHIPPED | OUTPATIENT
Start: 2018-07-02 | End: 2018-07-05 | Stop reason: SDUPTHER

## 2018-07-05 ENCOUNTER — TELEPHONE (OUTPATIENT)
Dept: FAMILY MEDICINE CLINIC | Facility: CLINIC | Age: 38
End: 2018-07-05

## 2018-07-05 DIAGNOSIS — F41.9 ANXIETY: ICD-10-CM

## 2018-07-05 DIAGNOSIS — E11.9 TYPE 2 DIABETES MELLITUS WITHOUT COMPLICATION, WITHOUT LONG-TERM CURRENT USE OF INSULIN (HCC): ICD-10-CM

## 2018-07-05 DIAGNOSIS — E11.9 CONTROLLED TYPE 2 DIABETES MELLITUS WITHOUT COMPLICATION, WITHOUT LONG-TERM CURRENT USE OF INSULIN (HCC): ICD-10-CM

## 2018-07-05 RX ORDER — GLIMEPIRIDE 2 MG/1
2 TABLET ORAL
Qty: 90 TABLET | Refills: 0 | Status: SHIPPED | OUTPATIENT
Start: 2018-07-05 | End: 2018-08-30 | Stop reason: SDUPTHER

## 2018-07-05 RX ORDER — ESCITALOPRAM OXALATE 10 MG/1
10 TABLET ORAL DAILY
Qty: 90 TABLET | Refills: 0 | Status: SHIPPED | OUTPATIENT
Start: 2018-07-05 | End: 2018-08-30 | Stop reason: SDUPTHER

## 2018-07-05 NOTE — TELEPHONE ENCOUNTER
Patient is requesting a refill of his Janumet xr , Glimeperide and Lexapro to be sent to [facility] in [facility]  I updated his pharmacy in the snapshot  [place] has [place] out of state  Thanks

## 2018-08-30 DIAGNOSIS — E11.9 CONTROLLED TYPE 2 DIABETES MELLITUS WITHOUT COMPLICATION, WITHOUT LONG-TERM CURRENT USE OF INSULIN (HCC): ICD-10-CM

## 2018-08-30 DIAGNOSIS — F41.9 ANXIETY: ICD-10-CM

## 2018-08-30 RX ORDER — ESCITALOPRAM OXALATE 10 MG/1
TABLET ORAL
Qty: 30 TABLET | Refills: 1 | Status: SHIPPED | OUTPATIENT
Start: 2018-08-30 | End: 2018-10-31 | Stop reason: SDUPTHER

## 2018-08-30 RX ORDER — GLIMEPIRIDE 2 MG/1
2 TABLET ORAL
Qty: 30 TABLET | Refills: 1 | Status: SHIPPED | OUTPATIENT
Start: 2018-08-30 | End: 2018-10-31 | Stop reason: SDUPTHER

## 2018-09-30 DIAGNOSIS — E11.9 TYPE 2 DIABETES MELLITUS WITHOUT COMPLICATION, WITHOUT LONG-TERM CURRENT USE OF INSULIN (HCC): ICD-10-CM

## 2018-10-01 RX ORDER — SITAGLIPTIN AND METFORMIN HYDROCHLORIDE 100; 1000 MG/1; MG/1
TABLET, FILM COATED, EXTENDED RELEASE ORAL
Qty: 90 TABLET | Refills: 0 | Status: SHIPPED | OUTPATIENT
Start: 2018-10-01 | End: 2018-12-29 | Stop reason: SDUPTHER

## 2018-10-31 DIAGNOSIS — E11.9 CONTROLLED TYPE 2 DIABETES MELLITUS WITHOUT COMPLICATION, WITHOUT LONG-TERM CURRENT USE OF INSULIN (HCC): ICD-10-CM

## 2018-10-31 DIAGNOSIS — F41.9 ANXIETY: ICD-10-CM

## 2018-10-31 RX ORDER — GLIMEPIRIDE 2 MG/1
2 TABLET ORAL
Qty: 30 TABLET | Refills: 1 | Status: SHIPPED | OUTPATIENT
Start: 2018-10-31 | End: 2019-01-02 | Stop reason: SDUPTHER

## 2018-10-31 RX ORDER — ESCITALOPRAM OXALATE 10 MG/1
TABLET ORAL
Qty: 30 TABLET | Refills: 1 | Status: SHIPPED | OUTPATIENT
Start: 2018-10-31 | End: 2019-01-02 | Stop reason: SDUPTHER

## 2018-12-27 ENCOUNTER — TELEPHONE (OUTPATIENT)
Dept: FAMILY MEDICINE CLINIC | Facility: CLINIC | Age: 38
End: 2018-12-27

## 2018-12-27 DIAGNOSIS — E11.9 CONTROLLED TYPE 2 DIABETES MELLITUS WITHOUT COMPLICATION, WITHOUT LONG-TERM CURRENT USE OF INSULIN (HCC): ICD-10-CM

## 2018-12-27 DIAGNOSIS — F41.9 ANXIETY: ICD-10-CM

## 2018-12-27 NOTE — TELEPHONE ENCOUNTER
PATIENT HAS MOVED OUT OF STATE TO [place]  HE HAS NOT TECHNICALLY TRANSFERRED OUT OF OUR OFFICE SINCE WE DID NOT RECEIVE ANYTHING FROM ANOTHER PCP OFFICE  HE IS OUT OF HIS MEDS & WAS LOOKING FOR A MONTH SUPPLY OF HIS LEXAPRO, GLIMEPIRIDE, AND JANUMET XR  HE HAS AN APPOINTMENT WITH HIS NEW DOCTOR IN THE MIDDLE OF February AND IT LOOKING FOR A REFILL JUST TO GET HIM BY  I TOLD HIM THAT AN OFFICE VISIT MIGHT BE NECESSARY BUT I WOULD SEND A MESSAGE BACK TO CLARIFY       [facility]   [address] [address] [facility]     PHONE# [phone]    Sravan Cullen!!

## 2018-12-29 DIAGNOSIS — E11.9 TYPE 2 DIABETES MELLITUS WITHOUT COMPLICATION, WITHOUT LONG-TERM CURRENT USE OF INSULIN (HCC): ICD-10-CM

## 2018-12-30 RX ORDER — SITAGLIPTIN AND METFORMIN HYDROCHLORIDE 100; 1000 MG/1; MG/1
TABLET, FILM COATED, EXTENDED RELEASE ORAL
Qty: 90 TABLET | Refills: 0 | Status: SHIPPED | OUTPATIENT
Start: 2018-12-30 | End: 2019-04-04 | Stop reason: SDUPTHER

## 2019-01-02 RX ORDER — GLIMEPIRIDE 2 MG/1
2 TABLET ORAL
Qty: 30 TABLET | Refills: 1 | Status: SHIPPED | OUTPATIENT
Start: 2019-01-02 | End: 2019-08-28 | Stop reason: SDUPTHER

## 2019-01-02 RX ORDER — ESCITALOPRAM OXALATE 10 MG/1
10 TABLET ORAL DAILY
Qty: 30 TABLET | Refills: 1 | Status: SHIPPED | OUTPATIENT
Start: 2019-01-02 | End: 2019-08-28 | Stop reason: SDUPTHER

## 2019-01-02 NOTE — TELEPHONE ENCOUNTER
Orders were placed for 1 month  Please have him schedule with a family doctor locally as we will not be able to continue this long term

## 2019-04-04 DIAGNOSIS — E11.9 TYPE 2 DIABETES MELLITUS WITHOUT COMPLICATION, WITHOUT LONG-TERM CURRENT USE OF INSULIN (HCC): ICD-10-CM

## 2019-04-08 RX ORDER — SITAGLIPTIN AND METFORMIN HYDROCHLORIDE 100; 1000 MG/1; MG/1
TABLET, FILM COATED, EXTENDED RELEASE ORAL
Qty: 90 TABLET | Refills: 0 | Status: SHIPPED | OUTPATIENT
Start: 2019-04-08

## 2019-08-28 DIAGNOSIS — E11.9 CONTROLLED TYPE 2 DIABETES MELLITUS WITHOUT COMPLICATION, WITHOUT LONG-TERM CURRENT USE OF INSULIN (HCC): ICD-10-CM

## 2019-08-28 DIAGNOSIS — F41.9 ANXIETY: ICD-10-CM

## 2019-08-29 RX ORDER — GLIMEPIRIDE 2 MG/1
2 TABLET ORAL
Qty: 30 TABLET | Refills: 1 | Status: SHIPPED | OUTPATIENT
Start: 2019-08-29 | End: 2019-10-28 | Stop reason: SDUPTHER

## 2019-08-29 RX ORDER — ESCITALOPRAM OXALATE 10 MG/1
TABLET ORAL
Qty: 30 TABLET | Refills: 1 | Status: SHIPPED | OUTPATIENT
Start: 2019-08-29 | End: 2019-10-28 | Stop reason: SDUPTHER

## 2019-09-23 DIAGNOSIS — E11.9 TYPE 2 DIABETES MELLITUS WITHOUT COMPLICATION, WITHOUT LONG-TERM CURRENT USE OF INSULIN (HCC): ICD-10-CM

## 2019-09-23 RX ORDER — SITAGLIPTIN AND METFORMIN HYDROCHLORIDE 100; 1000 MG/1; MG/1
TABLET, FILM COATED, EXTENDED RELEASE ORAL
Qty: 30 TABLET | Refills: 2 | OUTPATIENT
Start: 2019-09-23

## 2019-10-28 DIAGNOSIS — E11.9 CONTROLLED TYPE 2 DIABETES MELLITUS WITHOUT COMPLICATION, WITHOUT LONG-TERM CURRENT USE OF INSULIN (HCC): ICD-10-CM

## 2019-10-28 DIAGNOSIS — F41.9 ANXIETY: ICD-10-CM

## 2019-10-28 RX ORDER — GLIMEPIRIDE 2 MG/1
2 TABLET ORAL
Qty: 30 TABLET | Refills: 1 | Status: SHIPPED | OUTPATIENT
Start: 2019-10-28

## 2019-10-28 RX ORDER — ESCITALOPRAM OXALATE 10 MG/1
TABLET ORAL
Qty: 30 TABLET | Refills: 1 | Status: SHIPPED | OUTPATIENT
Start: 2019-10-28

## 2019-12-24 DIAGNOSIS — F41.9 ANXIETY: ICD-10-CM

## 2019-12-24 DIAGNOSIS — E11.9 CONTROLLED TYPE 2 DIABETES MELLITUS WITHOUT COMPLICATION, WITHOUT LONG-TERM CURRENT USE OF INSULIN (HCC): ICD-10-CM

## 2019-12-24 RX ORDER — ESCITALOPRAM OXALATE 10 MG/1
TABLET ORAL
Qty: 30 TABLET | Refills: 1 | OUTPATIENT
Start: 2019-12-24

## 2019-12-24 RX ORDER — GLIMEPIRIDE 2 MG/1
2 TABLET ORAL
Qty: 30 TABLET | Refills: 1 | OUTPATIENT
Start: 2019-12-24

## 2019-12-24 NOTE — TELEPHONE ENCOUNTER
Review of patient notes it appears this patient moved to [facility]  See if this can be confirmed  If patient is still here needs office visit last visit 6/2018

## 2020-02-04 ENCOUNTER — TELEPHONE (OUTPATIENT)
Dept: FAMILY MEDICINE CLINIC | Facility: CLINIC | Age: 40
End: 2020-02-04

## 2020-02-04 NOTE — TELEPHONE ENCOUNTER
Pt transferred out of the practice to [place] [id number] [place], [facility] [address]  on 1/23/2019

## 2020-02-04 NOTE — TELEPHONE ENCOUNTER
Per pt he transferred out of [facility]  Latrell Villatoro S  [address]  [address] [place], [facility] [address]  on 1/23/2019

## 2020-02-18 NOTE — TELEPHONE ENCOUNTER
02/18/20 2:02 PM     Thank you for your request  Your request has been received, reviewed, and the patient chart updated  The PCP has successfully been removed with a patient attribution note  This message will now be completed      Thank you  Gillian Abrams